# Patient Record
Sex: MALE | Race: WHITE | NOT HISPANIC OR LATINO | URBAN - METROPOLITAN AREA
[De-identification: names, ages, dates, MRNs, and addresses within clinical notes are randomized per-mention and may not be internally consistent; named-entity substitution may affect disease eponyms.]

---

## 2019-02-19 NOTE — PATIENT DISCUSSION
Recommend CATARACT SURGERY to see if cataract surgery improves vision enough to eliminate the need for removal of the ERM. Patient understands it is likely a vitrectomy will be needed after the cataract surgery to further improve their vision. They understand the cataract surgery will improve the view of the macular pucker during the surgery, and will improve the view and ability to treat any retinal problems after the surgery.

## 2019-02-19 NOTE — PATIENT DISCUSSION
WAS TOLD BY DR CAST IN 2017 THAT HAD MP, BUT NOT INVOLVING CENTER. NOW VA WORSENED WITH METAMORPHOPSIA.

## 2022-03-08 ENCOUNTER — NEW PATIENT (OUTPATIENT)
Dept: URBAN - METROPOLITAN AREA CLINIC 14 | Facility: CLINIC | Age: 72
End: 2022-03-08

## 2022-03-08 DIAGNOSIS — H43.813: ICD-10-CM

## 2022-03-08 DIAGNOSIS — H25.13: ICD-10-CM

## 2022-03-08 DIAGNOSIS — H04.123: ICD-10-CM

## 2022-03-08 PROCEDURE — 92015 DETERMINE REFRACTIVE STATE: CPT

## 2022-03-08 PROCEDURE — 92134 CPTRZ OPH DX IMG PST SGM RTA: CPT

## 2022-03-08 PROCEDURE — 92004 COMPRE OPH EXAM NEW PT 1/>: CPT

## 2022-03-08 ASSESSMENT — TONOMETRY
OD_IOP_MMHG: 14
OS_IOP_MMHG: 14

## 2022-03-08 ASSESSMENT — VISUAL ACUITY
OS_CC: 20/50+2
OD_CC: 20/50+1
OS_GLARE: 20/70
OD_GLARE: 20/80

## 2022-03-08 NOTE — PATIENT DISCUSSION
Approaching visual significance. However, pt indicates not consistently interfering with activities of daily living.

## 2022-06-17 ENCOUNTER — ESTABLISHED PATIENT (OUTPATIENT)
Dept: URBAN - METROPOLITAN AREA CLINIC 9 | Facility: CLINIC | Age: 72
End: 2022-06-17

## 2022-06-17 DIAGNOSIS — H04.123: ICD-10-CM

## 2022-06-17 DIAGNOSIS — H25.13: ICD-10-CM

## 2022-06-17 DIAGNOSIS — H43.813: ICD-10-CM

## 2022-06-17 PROCEDURE — 99211NC NO CHARGE VISIT

## 2022-06-17 ASSESSMENT — VISUAL ACUITY
OD_GLARE: 20/80
OS_GLARE: 20/100
OD_CC: 20/40+2
OS_CC: 20/40

## 2024-01-05 ENCOUNTER — ESTABLISHED PATIENT (OUTPATIENT)
Facility: LOCATION | Age: 74
End: 2024-01-05

## 2024-01-05 DIAGNOSIS — H04.123: ICD-10-CM

## 2024-01-05 DIAGNOSIS — H25.13: ICD-10-CM

## 2024-01-05 DIAGNOSIS — H44.23: ICD-10-CM

## 2024-01-05 DIAGNOSIS — H43.813: ICD-10-CM

## 2024-01-05 PROCEDURE — 92015 DETERMINE REFRACTIVE STATE: CPT

## 2024-01-05 PROCEDURE — 92014 COMPRE OPH EXAM EST PT 1/>: CPT

## 2024-01-05 ASSESSMENT — TONOMETRY
OD_IOP_MMHG: 17
OS_IOP_MMHG: 15

## 2024-01-05 ASSESSMENT — VISUAL ACUITY
OD_CC: 20/25
OS_CC: 20/30
